# Patient Record
Sex: FEMALE | Race: WHITE | NOT HISPANIC OR LATINO | URBAN - METROPOLITAN AREA
[De-identification: names, ages, dates, MRNs, and addresses within clinical notes are randomized per-mention and may not be internally consistent; named-entity substitution may affect disease eponyms.]

---

## 2017-01-01 ENCOUNTER — HOME CARE/HOSPICE - HEALTHEAST (OUTPATIENT)
Dept: HOSPICE | Facility: HOSPICE | Age: 82
End: 2017-01-01

## 2017-01-01 ENCOUNTER — OFFICE VISIT - HEALTHEAST (OUTPATIENT)
Dept: GERIATRICS | Facility: CLINIC | Age: 82
End: 2017-01-01

## 2017-01-01 ENCOUNTER — AMBULATORY - HEALTHEAST (OUTPATIENT)
Dept: HOSPICE | Facility: HOSPICE | Age: 82
End: 2017-01-01

## 2017-01-01 ENCOUNTER — AMBULATORY - HEALTHEAST (OUTPATIENT)
Dept: GERIATRICS | Facility: CLINIC | Age: 82
End: 2017-01-01

## 2017-01-01 DIAGNOSIS — E11.65 TYPE 2 DIABETES MELLITUS WITH HYPERGLYCEMIA (H): ICD-10-CM

## 2017-01-01 DIAGNOSIS — F02.818 DEMENTIA ASSOCIATED WITH OTHER UNDERLYING DISEASE WITH BEHAVIORAL DISTURBANCE (H): ICD-10-CM

## 2017-01-01 DIAGNOSIS — I48.91 ATRIAL FIBRILLATION (H): ICD-10-CM

## 2017-01-01 DIAGNOSIS — J44.9 COPD (CHRONIC OBSTRUCTIVE PULMONARY DISEASE) (H): ICD-10-CM

## 2017-01-01 DIAGNOSIS — I25.10 CAD (CORONARY ARTERY DISEASE): ICD-10-CM

## 2017-01-01 DIAGNOSIS — F32.A DEPRESSION: ICD-10-CM

## 2017-01-01 DIAGNOSIS — F03.90 DEMENTIA (H): ICD-10-CM

## 2017-01-01 DIAGNOSIS — I50.9 CONGESTIVE HEART FAILURE (H): ICD-10-CM

## 2017-01-01 RX ORDER — CHLORPROMAZINE HYDROCHLORIDE 25 MG/1
25 TABLET, FILM COATED ORAL
Status: SHIPPED | COMMUNITY
Start: 2017-01-01

## 2017-01-01 RX ORDER — CHLORPROMAZINE HYDROCHLORIDE 25 MG/1
50 TABLET, FILM COATED ORAL EVERY 6 HOURS
Status: SHIPPED | COMMUNITY
Start: 2017-01-01

## 2021-05-26 ENCOUNTER — RECORDS - HEALTHEAST (OUTPATIENT)
Dept: ADMINISTRATIVE | Facility: CLINIC | Age: 86
End: 2021-05-26

## 2021-05-27 ENCOUNTER — RECORDS - HEALTHEAST (OUTPATIENT)
Dept: ADMINISTRATIVE | Facility: CLINIC | Age: 86
End: 2021-05-27

## 2021-05-28 ENCOUNTER — RECORDS - HEALTHEAST (OUTPATIENT)
Dept: ADMINISTRATIVE | Facility: CLINIC | Age: 86
End: 2021-05-28

## 2021-05-29 ENCOUNTER — RECORDS - HEALTHEAST (OUTPATIENT)
Dept: ADMINISTRATIVE | Facility: CLINIC | Age: 86
End: 2021-05-29

## 2021-05-30 VITALS — BODY MASS INDEX: 35.48 KG/M2 | WEIGHT: 194 LBS

## 2021-05-30 VITALS — WEIGHT: 180 LBS | BODY MASS INDEX: 32.92 KG/M2

## 2021-05-30 VITALS — WEIGHT: 180.6 LBS | BODY MASS INDEX: 33.03 KG/M2

## 2021-05-31 ENCOUNTER — RECORDS - HEALTHEAST (OUTPATIENT)
Dept: ADMINISTRATIVE | Facility: CLINIC | Age: 86
End: 2021-05-31

## 2021-06-01 ENCOUNTER — RECORDS - HEALTHEAST (OUTPATIENT)
Dept: ADMINISTRATIVE | Facility: CLINIC | Age: 86
End: 2021-06-01

## 2021-06-02 ENCOUNTER — RECORDS - HEALTHEAST (OUTPATIENT)
Dept: ADMINISTRATIVE | Facility: CLINIC | Age: 86
End: 2021-06-02

## 2021-06-03 ENCOUNTER — RECORDS - HEALTHEAST (OUTPATIENT)
Dept: ADMINISTRATIVE | Facility: CLINIC | Age: 86
End: 2021-06-03

## 2021-06-08 NOTE — PROGRESS NOTES
HOSPICE CNP FACE TO FACE VISIT ON 2/1/2017 AT [facility] IN [place], [place] with SOFIA ARVIZU-HOSPICE RN CASE MANAGER PRESENT:    Summary: Jing Arriaza is a 89 y.o.  female on hospice for a primary diagnosis of congestive heart failure. She was admitted to the hospice program after a hospitalization in August of 2016 in which she had an exacerbation of her CHF and required bipap. She also had pneumonia at that time. She was ultimately discharged and placed on hospice/comfort cares.     Significant comorbidities/coexisting conditions include:  COPD  Hx pneumonia  Atrial fibrillation with RVR  Hx TIAs  Hx UTIs, hx MRSA  Hx acute kidney injury  Hx coronary artery disease s/p MI  Peripheral vascular disease  Obesity  Diabetes  Dementia   Head injury    Current Outpatient Prescriptions   Medication Sig Dispense Refill     atropine 1 % ophthalmic solution PRN secretions 2 mL 12     benzocaine-menthol (CEPACOL) 15-3.6 mg Take 1 lozenge by mouth every hour as needed.  0     chlorproMAZINE (THORAZINE) 25 MG tablet Apply 25 mg topically every 6 (six) hours as needed (AGITATION/DELIRIUM). THORAZINE GEL 0.1 CC= 25MG  APPLY TOPICALLY TO INNER WRIST EVERY 6 HOURS.  Indications: AGITATION/DELIRIUM       chlorproMAZINE (THORAZINE) 25 MG tablet Apply 25 mg topically every 2 (two) hours as needed (AGITATION/DELIRIUM). THORAZINE GEL 0.1 CC = 25MG, APPLY 25MG TO INNER WRIST Q2H PRN  Indications: DELIRIUM/AGITATION       clopidogrel (PLAVIX) 75 mg tablet Take 75 mg by mouth daily.        famotidine (PEPCID) 20 MG tablet Take 20 mg by mouth every evening.        furosemide (LASIX) 20 MG tablet Take 60 mg by mouth daily.        guaiFENesin (ROBITUSSIN) 100 mg/5 mL syrup Take 200 mg by mouth every 4 (four) hours as needed for cough.        ipratropium-albuterol (DUONEB) 0.5-2.5 mg/mL nebulizer Take 3 mL by nebulization 4 (four) times a day as needed.        lisinopril (PRINIVIL,ZESTRIL) 2.5 MG tablet Take 2.5 mg by mouth  "daily.        metoprolol tartrate (LOPRESSOR) 25 MG tablet Take 25 mg by mouth 2 (two) times a day. Indications: Hypertension       morphine 100 mg/5 mL (20 mg/mL) concentrated solution Take 0.25-0.5 mL (5-10 mg total) by mouth every hour as needed for pain (moderate pain or dyspnea). (Patient taking differently: Take 5 mg by mouth every 4 (four) hours as needed for pain. Indications: Severe Pain with Opioid Tolerance) 15 mL 0     nystatin (MYCOSTATIN) cream Apply 1 application topically 2 (two) times a day as needed (rash). Indications: red rash in abd folds       scopolamine (TRANSDERM-SCOP) 1.5 mg (1 mg over 3 days) transdermal patch Place 1 patch on the skin every third day as needed (secretions). 10 patch 12     senna-docusate (PERICOLACE) 8.6-50 mg tablet Take 1 tablet by mouth 2 (two) times a day. Indications: Constipation       traMADol (ULTRAM) 50 mg tablet Take 50 mg by mouth every 6 (six) hours as needed for pain. per Hospital discharge.  Indications: Pain       No current facility-administered medications for this visit.      Functional status: Jing is non-ambulatory, nonweightbearing, unable to express her needs related to dementia, and requires total care. She is impulsive and is a fall risk. Though she is visibly short of breath, she usually tears off the oxygen when it is on her. She is often combative or agitated, is requiring antipsychotics to control behaviors. She is on a low dose of scheduled morphine for her breathing, but she will typically spit out meds. She often refuses meals. Today she refused breakfast. She is incontinent of bowel and bladder and nearly completely deaf. Jing resides in a skilled nursing facility in [place],     Current symptoms: Jing moans, \"I feel sick\", repeating the words, \"Oh God!\" Does not answer questions in interview. Calls for her mother. She is unable to report accurately.    Changes in condition: Jing has had a weight loss (202# to 186#), she " is often refusing meals and her oxygen levels are low. She has signs of dehydration.    Review of systems:   Constitutional: Positive for weight loss, fatigue.  Neurological: Positive for severe hearing loss. No seizures.  Skin: Negative for rashes, bruises or pressure ulcers.   Respiratory: Positive for signs of shortness of breath, low SaO2s.   Cardiovascular: Negative except for history.  Gastrointestinal: Negative for nausea or vomiting. Positive for poor appetite.    Musculoskeletal: Positive for muscle weakness.   Genitourinary  Positive hx of UTIs.     Physical examination:     VS: /64, HR 85 irregular, RR 22-irregular, SaO2 88% RA. Right mid arm circumference 30cm. PPS 30%. Weight:186# (was 202# at start of care).     General: Elderly woman of moderate size with disheveled appearance seated in a wheelchair at the nursing home. She is intermittently restless, changing position, moaning and calling out--with periods of eyes closed, apnea of 20-30 seconds. She is uncooperative in exam. She has noticeable muscle wasting in the upper and lower extremities.   Neurological: Unable to follow commands, restless, disoriented, unable to answer questions even using a white board. Speech is difficult to understand.  HEENT: Atraumatic, temporal and facial wasting noted, loose skin around the neck. Missing many teeth.  Skin: Pale, dusky, cool. Has skin tenting, clubbing of the fingernails. Lower extremities are purple in color.   Respiratory: Breathing irregularly with periods of apnea alternating with breathing labored. Notable for inspiratory crackles posteriorly in lower lobes.    Cardiovascular: Radial pulses weak, pulse irregular. Heart sounds very distant, irregular.   Abdomen: Obese, round, soft, with hypoactive bowel sounds.   Extremities: Cyanotic, purple in color in lower extremities, cool.     Based on continued respiratory insufficiency, poor appetite, weight loss and poor circulation, Jing is  declining.     Mayra Mendez, APRN/CNP  [facility]

## 2021-06-08 NOTE — PROGRESS NOTES
"[facility]    Facility:   [facility] [[id number]]   Code Status: POLST AVAILABLE      Routine monthly evaluation of long-term care patient on hospice with chief diagnosis of advanced dementia. Additional diagnoses of congestive heart failure,  complete hearing loss, intermittent agitation, COPD with chronic respiratory insufficiency, diabetes mellitus 2, encephalopathy, chronic kidney disease stage III.    Review of systems: patient is unable to participate in review of systems, is encephalopathic and deaf. Nursing staff report patient intermittently hallucinates, she typically does not allow for blood pressure to be checked, appears to be comfortable on current medication which includes morphine 5 mg Q6 hours, Haldol 1 mg Q4 hours PRN and Thorazine 25 mg to inner wrist Q6 hours scheduled and every two hours PRN for agitation and delirium.    Exam: patient is lying on left side in bed, initially agitated and states \"help me.\" Patient falls asleep quickly reawakens and smiles, appears calm. No use of accessory muscles at rest. Mucous membranes moist. Skin turgor intact. S1 and S2 irregular with holosystolic murmur 2/6. Pulmonary exam with shallow inspiratory effort, no rales rhonchi or wheezes. Abdomen without masses or withdrawal to palpation. No organomegaly. Periphery trace nonpitting edema, venous stasis changes, pedal pulses -3. Strength cannot be adequately assessed.    Impression and plan: advanced dementia with recent hallucinations, patient receiving Thorazine morphine and Haldol per hospice. Coronary artery disease with history of myocardial infarction, no indication of active angina. Congestive heart failure compensated. Diabetes mellitus chronic. Peripheral vascular disease, no ulcerations lower extremity. Care plan and medications are reviewed, vital signs are not reviewed as patient has not allowed for these to be taken of late.    Electronically signed by: Jazmin " Marni Montenegro MD

## 2021-06-08 NOTE — PROGRESS NOTES
Code Status:  DNR/DNI  Visit Type: Review Of Multiple Medical Conditions     Facility:  [facility] [[id number]]         Facility Type: [facility] (Long Term Care, LTC)    History of Present Illness: Jing Arriaza is a 89 y.o. female  who I am seeing today for follow up of chronic medical conditions including CHF, COPD, Dementia, Type II DM, atrial fib  and recent rash. Pt continues on [facility] hospice. She continues with weight loss down about ~13 lbs. Pt with increased restlessness and refusal to take meds or eat at times. She is resistive to my visit today. She attempts to strike out at me. Hospice in today and will increase Thorazine.     Active Ambulatory Problems     Diagnosis Date Noted     Dementia 11/17/2015     Congestive heart failure 01/31/2016     COPD (chronic obstructive pulmonary disease) 01/31/2016     Respiratory distress 08/11/2016     HCAP (healthcare-associated pneumonia)      Type 2 diabetes mellitus with hyperglycemia      Acute on chronic combined systolic and diastolic congestive heart failure      Encephalopathy acute      Shortness of breath      Restlessness and agitation      Palliative care encounter      Resolved Ambulatory Problems     Diagnosis Date Noted     Atrial fibrillation      Congestive heart failure      Essential hypertension      Delirium (Symptom)      Hyperlipidemia      Migraine Headache      Cataract      Subarachnoid hemorrhage      Transient Ischemic Attack      Mild Intermittent Asthma      Chronic Obstructive Pulmonary Disease      Head injury, acute 07/15/2014     Lower extremity cellulitis 07/15/2014     Cellulitis, leg 07/15/2014     CAD (coronary artery disease) 11/17/2015     Past Medical History   Diagnosis Date     Acute bronchitis      Altered mental status      Alzheimer disease      Anemia      Arthritis      Back pain      Cataract      Cellulitis      Cerebral artery occlusion with cerebral infarction      CHF (congestive heart failure)       Coagulation disorder      Constipation      Depression      Dermatophytosis      Dermatophytosis      Diabetes mellitus      Diverticular disease      Diverticulitis      Frequent falls      GERD (gastroesophageal reflux disease)      Hearing loss      [place] (hard of hearing)      Hyperlipidemia      Hypertension      Lumbago      MI (myocardial infarction)      Mild intermittent asthma      MRSA (methicillin resistant Staphylococcus aureus)      Pneumonia      PVD (peripheral vascular disease)      TIA (transient ischemic attack)      Urinary incontinence      UTI (urinary tract infection)      Zoster          Allergies   Allergen Reactions     Soap        Review of Systems   Refer to HPI     Physical Exam  PHYSICAL EXAMINATION:  Vital signs:   Vitals:    02/01/17 1818   BP: 121/64   Pulse: 85   Resp: 22   Temp: 99.9  F (37.7  C)   SpO2: 95%     PHYSICAL EXAMINATION:  Vital signs:   Vitals:    02/01/17 1818   BP: 121/64   Pulse: 85   Resp: 22   Temp: 99.9  F (37.7  C)   SpO2: 95%     General: Awakens, resistive to visit . Attempts to strike out at me.   HEENT:PERRLA, Pink conjunctiva, anicteric sclerae,  poor dentition.   NECK: Supple  CVS:  S1  S2, without murmur or gallop.   LUNG: Diminished with shallow effort.   ABDOMEN: Soft, obese, nontender to palpation, with positive bowel sounds  EXTREMITIES: Moves both upper and lower extremities, no pedal edema    Assessment/Plan:   1. Dementia associated with other underlying disease with behavioral disturbance     2. Atrial fibrillation     3. Congestive heart failure     4. COPD (chronic obstructive pulmonary disease)     5. Type 2 diabetes mellitus with hyperglycemia     6. Depression     7. CAD (coronary artery disease)       Pt with advanced dementia with hx of psychosis. She has continued on Thorazine. She has more episodes of restlessness and refusal of cares, meds and meals. Today Hospice saw her and increased her Throrazine. She is resistive to my visit. She  continues with weight loss down ~13 lbs. She is using prn Morphine for SOB or pain. CHF. Trace LE edema. Atrial fib rate controlled. [facility]. She is spending more time in her room in bed.     Electronically signed by: Jaky Brunner CNP